# Patient Record
Sex: FEMALE | Race: WHITE
[De-identification: names, ages, dates, MRNs, and addresses within clinical notes are randomized per-mention and may not be internally consistent; named-entity substitution may affect disease eponyms.]

---

## 2019-02-15 ENCOUNTER — HOSPITAL ENCOUNTER (OUTPATIENT)
Dept: HOSPITAL 84 - D.ER | Age: 64
Setting detail: OBSERVATION
Discharge: HOME | End: 2019-02-15
Attending: INTERNAL MEDICINE | Admitting: INTERNAL MEDICINE
Payer: MEDICARE

## 2019-02-15 VITALS — DIASTOLIC BLOOD PRESSURE: 83 MMHG | SYSTOLIC BLOOD PRESSURE: 133 MMHG

## 2019-02-15 VITALS — SYSTOLIC BLOOD PRESSURE: 160 MMHG | DIASTOLIC BLOOD PRESSURE: 80 MMHG

## 2019-02-15 VITALS — WEIGHT: 215.45 LBS | HEIGHT: 69 IN | BODY MASS INDEX: 31.91 KG/M2

## 2019-02-15 VITALS — SYSTOLIC BLOOD PRESSURE: 147 MMHG | DIASTOLIC BLOOD PRESSURE: 91 MMHG

## 2019-02-15 DIAGNOSIS — I10: ICD-10-CM

## 2019-02-15 DIAGNOSIS — I25.119: Primary | ICD-10-CM

## 2019-02-15 DIAGNOSIS — I49.3: ICD-10-CM

## 2019-02-15 LAB
ALBUMIN SERPL-MCNC: 3.2 G/DL (ref 3.4–5)
ALP SERPL-CCNC: 46 U/L (ref 46–116)
ALT SERPL-CCNC: 19 U/L (ref 10–68)
ANION GAP SERPL CALC-SCNC: 17.6 MMOL/L (ref 8–16)
BASOPHILS NFR BLD AUTO: 0.3 % (ref 0–2)
BILIRUB SERPL-MCNC: 0.46 MG/DL (ref 0.2–1.3)
BUN SERPL-MCNC: 9 MG/DL (ref 7–18)
CALCIUM SERPL-MCNC: 7.9 MG/DL (ref 8.5–10.1)
CHLORIDE SERPL-SCNC: 105 MMOL/L (ref 98–107)
CK MB SERPL-MCNC: 5.5 U/L (ref 0–3.6)
CK SERPL-CCNC: 156 UL (ref 21–215)
CO2 SERPL-SCNC: 22.2 MMOL/L (ref 21–32)
CREAT SERPL-MCNC: 0.8 MG/DL (ref 0.6–1.3)
EOSINOPHIL NFR BLD: 2.5 % (ref 0–7)
ERYTHROCYTE [DISTWIDTH] IN BLOOD BY AUTOMATED COUNT: 13.8 % (ref 11.5–14.5)
GLOBULIN SER-MCNC: 3.8 G/L
GLUCOSE SERPL-MCNC: 116 MG/DL (ref 74–106)
HCT VFR BLD CALC: 41.3 % (ref 36–48)
HGB BLD-MCNC: 14.3 G/DL (ref 12–16)
IMM GRANULOCYTES NFR BLD: 0.1 % (ref 0–5)
LYMPHOCYTES NFR BLD AUTO: 17 % (ref 15–50)
MCH RBC QN AUTO: 30.9 PG (ref 26–34)
MCHC RBC AUTO-ENTMCNC: 34.6 G/DL (ref 31–37)
MCV RBC: 89.2 FL (ref 80–100)
MONOCYTES NFR BLD: 6.7 % (ref 2–11)
NEUTROPHILS NFR BLD AUTO: 73.4 % (ref 40–80)
OSMOLALITY SERPL CALC.SUM OF ELEC: 280 MOSM/KG (ref 275–300)
PLATELET # BLD: 189 10X3/UL (ref 130–400)
PMV BLD AUTO: 11.3 FL (ref 7.4–10.4)
POTASSIUM SERPL-SCNC: 3.8 MMOL/L (ref 3.5–5.1)
PROT SERPL-MCNC: 7 G/DL (ref 6.4–8.2)
RBC # BLD AUTO: 4.63 10X6/UL (ref 4–5.4)
SODIUM SERPL-SCNC: 141 MMOL/L (ref 136–145)
TROPONIN I SERPL-MCNC: < 0.017 NG/ML (ref 0–0.06)
WBC # BLD AUTO: 7.9 10X3/UL (ref 4.8–10.8)

## 2019-02-15 NOTE — NUR
PT DISCHARGED HOME WITH PAPER PLAVIX SCRIPT. DISCHARGE INSTRUCTIONS AND AFTER
CARE DIRECTIONS GIVEN. CATHETER SITE TO RIGHT RADIAL CLEAN AND DRY. VITALS
STABLE. PT ALERT AND ORIENTED. 20G IV DC'D WITH CATHETER TIP IN PLACE. PT
DENIES ANY FURTER NEEDS AT THIS TIME.

## 2019-02-15 NOTE — NUR
RECIEVED FROM CATH LAB. TELEMERTY SHOWS SR. RIGHT TR BAND WITH NO SWELLING OR
EDEMA . NO   NEEDS VOICED. V/S STABLE.

## 2019-02-15 NOTE — HEMODYNAMI
PATIENT:PAMELA RATLIFF                                MEDICAL RECORD: [id number]
: 57                                            [facility]AV[facility]-
[facility]# [id number]                                       ADMISSION DATE: 02/15/19
 
 
 Generatedon:2/15/817178:29
Patient name: PAMELA RATLIFF Patient #: [id number] Visit #: [id number] SSN: YOB: 1955 Date of
study: 2/15/2019
Page: Of
Hemodynamic Procedure Report
****************************
Patient Data
Patient Demographics
Procedure consent was obtained
First Name: PAMELA       Gender: Female
Last Name: GAUDENCIO            : 1955
Middle Initial: A           Age: 63 year(s)
Patient #: [id number]       Race: Unknown
Visit #: [id number]
Accession #:
[id number]
Additional ID: [id number]
Contact details
Address: [address] [place] [address]       Phone: [phone]
State: [place]
City: [place]
Zip code: [id number]
Admission
Admission Data
Admission Date: 2/15/2019   Admission Time: 15:55
Room #: D.E14
Procedure
Procedure Types
Cath Procedure
Diagnostic Procedure
LHC
LH w/Coronaries
Sedation Charges
Moderate Sedation up to 15 minutes
PCI Procedure
Coronary Stent
Coronary Stent Initial x2
Procedure Description
Procedure Date
Procedure Date: 2/15/2019
Procedure Start Time: 17:10
Procedure End Time: 17:25
Procedure Staff
Name                            Function
Tavon Varela MD                Performing Physician
Pamela Singleton RT             Monitor
Ansley Belle RN                Nurse
Fior Omer RT                    Scrub
Ivy De Dios RT               Scrub
Handy Esparza RT                  Circulator
Procedure Data
Cath Procedure
Fluoroscopy
 
Diagnostic fluoroscopy      Total fluoroscopy Time: 3.4
time: 3.4 min               min
Diagnostic fluoroscopy      Total fluoroscopy dose: 763
dose: 763 mGy               mGy
Contrast Material
Contrast Material Type                       Amount (ml)
Isovue 300                                   93
Entry Location
Entry     Primary  Successful  Side  Size  Upsize Upsize Entry    Closure     Jordan
ccessful  Closure
Location                             (Fr)  1 (Fr) 2 (Fr) Remarks  Device        
          Remarks
Radial                         Right 6 Fr                         Mechanical
artery                               Short                        Compression
Estimated blood loss: 5 ml
Diagnostic catheters
Device Type               Used For           End Catheter
Placement
DIAGNOSTIC D[place]in 110cm 5  Multi-vessel
Fr catheter ([id number])      Angiography
Procedure Complications
No complications
Procedure Medications
Medication           Administration Route Dosage
0.9% NaCl            I.V.                 100 ml/hr
Oxygen               etCO2 Nasal cannula  2 l/min
Lidocaine 2%         added to field       20
Heparin Flush Bag    added to field       2 bags
(1000units/500ml NS)
Radial Cocktail      added to field       1 syringe
(Verapomil 2mg/Nitro
400mcg/Heparin
1500units)
Versed               I.V.                 2 mg
Fentanyl             I.V.                 50 mcg
Versed               I.V.                 2 mg
Fentanyl             I.V.                 50 mcg
Fentanyl             I.V.                 100 mcg
Versed               I.V.                 2 mg
Heparin Bolus        I.V.                 4000 units
Integrilin (Bolus    I.V.                 9 ml
2mg/ml)
Plavix               P.O.                 600 mg
Hemodynamics
Rest
Heart Rate: 82 (bpm)
Pressure Samples
Time     Site     Value (mmHg) Purpose      Heart      Use
Rate(bpm)
17:12    LV       21/7,15      Snapshot     87
Snapshots
Pre Cath      Intra         NCS           Post Cath
Vital Signs
Time     Heart  Resp   SPO2 etCO2   NIBP (mmHg)  Rhythm  Pain    Sedation
Rate   (ipm)  (%)  (mmHg)                       Status  Level
(bpm)
16:58:55 75     13     98   23      157/90(118)  NSR     0 (11)  10(A)
, No
pain
17:03:26 77     15     98   31.9    148/85(123)  NSR     0 (11)  10(A)
 
, No
pain
17:08:45 76     10     99   29      144/94(113)  NSR     0 (11)  10(A)
, No
pain
17:12:59 86     21     99   32.7    127/108(119) NSR     0 (11)  10(A)
, No
pain
17:17:19 72     16     97   30.4    147/81(118)  NSR     0 (11)  10(A)
, No
pain
17:21:45 89     14     99   30      134/93(119)  NSR     0 (11)  10(A)
, No
pain
17:26:12 87     14     89   0       139/79(122)  NSR     0 (11)  10(A)
, No
pain
Medications
Time     Medication       Route   Dose    Verified Delivered Reason          Not
es    Effectiveness
by       by
16:58:05 0.9% NaCl        I.V.    100     Tavon Boogieyla     used for
ml/hr   Avery Belle   procedure
RN
16:58:12 Oxygen           etCO2   2 l/min Tavon Riveraa     used for
Nasal           Avery Belle   procedure
cannula                  RN
16:58:18 Lidocaine 2%     added   20ml    Tavon  Tavon   for local
to      vial    Avery Varela MD  anesthetic
field
16:58:23 Heparin Flush    added   2 bags  Tavon Santosrey   used for
Bag              to              Avery Varela MD  procedure
(1000units/500ml field
NS)
16:58:29 Radial Cocktail  added   1       Tavon  Tavon   used for
(Verapomil       to      syringe Avery Varela MD  procedure
2mg/Nitro        field
400mcg/Heparin
1500units)
17:01:05 Versed           I.V.    2 mg    Tavon  Ansley     for sedation
Avery Belle RN
17:01:14 Fentanyl         I.V.    50 mcg  Tavon  Ansley     for sedation
Avery Belle RN
17:06:09 Fentanyl         I.V.    50 mcg  Tavon  Ansley     for sedation
Avery Belle RN
17:06:25 Versed           I.V.    2 mg    Tavon  Ansley     for sedation
Avery Belle RN
17:12:16 Fentanyl         I.V.    100 mcg Tavon  Ansley     for sedation
Avery Belle RN
17:12:46 Versed           I.V.    2 mg    Tavon  Ansley     for sedation
Avery Belle RN
17:15:11 Heparin Bolus    I.V.    4000    Tavon  Ansley     for             evelyne
ified
units   Avery Belle   anticoagulation with Dr. FAUZIA Varela
17:21:26 Integrilin       I.V.    9 ml    Tavon  Ansley     for             was
zackery
(Bolus 2mg/ml)                   Avery Belle   anticoagulation 1mL
RN
17:21:42 Plavix           P.O.    600 mg  Tavon  Ansley     for
Avery Belle   antiplatelet
RN        therapy
Procedure Log
Time     Note
16:37:27 Time tracking: Stay late (Procedures after 5:00pm)
16:37:40 Plan of Care:Hemodynamics will remain stable., Cardiac
rhythm will remain stable., Comfort level will be
maintained., Respiratory function will remain
adequate., Patient/ family verbilizes understanding of
procedure., Procedure tolerated without complication.,
Recovers from procedure without complications..
16:43:47 Pamela Singleton RT(R) sent for patient. Start room
use.
16:55:38 Patient received from ED to CCL 1 Alert and oriented.
Tansferred to table in Supine position.
16:55:39 Warm blankets applied, and khadar hugger turned on for
patient comfort.
16:55:40 Correct patient and procedure confirmed by team.
16:55:41 Signed procedure consent form obtained from patient.
16:55:42 ECG and BP/O2 sat monitors applied to patient.
16:55:43 Full Disclosure recording started
16:57:15 Vital chart was started
16:57:18 Baseline sample Acquired.
16:57:32 Rhythm: sinus rhythm
16:57:41 H&P Date Dictated: 2/15/2019 New H&P dictated by
physician..
16:57:58 Pre-procedure instructions explained to patient.
16:57:59 Pre-op teaching completed and patient verbalized
understanding.
16:58:02 Family in waiting room.
16:58:05 0.9% NaCl 100 ml/hr I.V. was administered by Ansley Belle RN; used for procedure;
16:58:12 Oxygen 2 l/min etCO2 Nasal cannula was administered by
Ansley Belle RN; used for procedure;
16:58:18 Lidocaine 2% 20ml vial added to field was administered
by Tavon Varela MD; for local anesthetic;
16:58:23 Heparin Flush Bag (1000units/500ml NS) 2 bags added to
field was administered by Tavon Varela MD; used for
procedure;
16:58:29 Radial Cocktail (Verapomil 2mg/Nitro 400mcg/Heparin
1500units) 1 syringe added to field was administered
by Tavon Varela MD; used for procedure;
16:58:52 Patient NPO since Midnight.
16:58:55 Is the patient allergic to Iodine/contrast media? No.
16:58:56 Was the patient premedicated? No
16:59:02 Is patient on blood thinner?No
17:00:01 Patient diabetic? No.
17:00:03 Previous problem with sedation/anesthesia? No ?
17:00:05 Snore? No
17:00:06 Sleep apnea? No
17:00:07 Deviated septum? No
17:00:08 Opens mouth fully? Yes
17:00:08 Sticks out tongue? Yes
17:00:10 Airway obstruction? No ?
 
17:00:13 Dentures? Yes out
17:00:17 Pre procedure: right dorsailis pedis pulse 2+ Normal;
easily identifiable; not easily obliterated
17:00:19 Pre procedure: left dorsailis pedis pulse 2+ Normal;
easily identifiable; not easily obliterated
17:00:21 Patient pain scale 0/10 ?.
17:00:29 IV patent on arrival in left antecubital with 0.9%
NaCl at [facility].
17:00:31 Lab results completed and on chart.
17:00:35 Right Radial & Right Groin area was prepped with
chlora-prep and draped in sterile fashion
17:00:36 Alarms reviewed by R. N.
17:00:37 Sharps counted by scrub and verified by R.N.
17:00:40 Physician arrived
17:00:40 --------ALL STOP TIME OUT------
17:00:41 Final Timeout: patient, procedure, and site verified
with staff and physician. All members of the team are
in agreement.
17:00:42 Right Radial & Right Groin site verified by team.
17:00:47 Fire Safety Assessment: A--An alcohol-based skin
anteseptic being used preoperatively., C--Open oxygen
or nitrous oxide is being used., D--An ESU, laser, or
fiber-optic light is being used.
17:00:51 Physical assessment completed. ASA score P 2 - A
patient with mild systemic disease as per Tavon Varela MD.
17:00:56 Sedation plan: IV Moderate Sedation Medication:Versed,
Fentanyl
17:01:05 Versed 2 mg I.V. was administered by Ansley Belle RN;
for sedation;
17:01:14 Fentanyl 50 mcg I.V. was administered by Ansley Belle
RN; for sedation;
17:06:09 Fentanyl 50 mcg I.V. was administered by Ansley Belle
RN; for sedation;
17:06:25 Versed 2 mg I.V. was administered by Ansley Belle RN;
for sedation;
17:07:07 Baseline sample Acquired.
17:10:09 Procedure started.
17:10:20 Local anesthetic to right radial artery with Lidocaine
2% by Tavon Varela MD.**INITIAL ACCESS ONLY**
17:10:36 A 6 Fr Short sheath was inserted into the Right Radial
artery
17:11:09 Use device set Radial Dx or PCI
17:11:10 ACIST Syringe (23342) opened to sterile field.
17:11:11 Medline Cath Pack ([id number]) opened to sterile field.
17:11:11 Bag Decanter () opened to sterile field.
17:11:11 DIAGNOSTIC WIRE .035 260cm J wire (329869) opened to
sterile field.
17:11:12 ACIST Hand Control (61281) opened to sterile field.
17:11:12 ACIST Manifold (23093) opened to sterile field.
17:11:13 Tegaderm 4 x 4 (1626W) opened to sterile field.
17:11:14 MBrace Wrist Support ([id number]) opened to sterile
field.
17:11:15 SHEATH 6FR Slender () opened to sterile field.
17:11:32 A DIAGNOSTIC Tiger 110cm 5 Fr catheter (389636) was
advanced over the wire and used for Multi-vessel
Angiography.
17:12:16 Fentanyl 100 mcg I.V. was administered by Ansley Belle RN; for sedation;
17:12:43 LV hemodynamics recorded.
 
17:12:45 LV gram done using JAEGER
17:12:46 Versed 2 mg I.V. was administered by Ansley Belle RN;
for sedation;
17:12:50 EF : 55 %
17:13:13 LCA angiography performed.
17:13:16 Injector settings: Ml/sec: 3, Volume: 6,
17:13:48 RCA angiography performed.
17:13:51 Injector settings: Ml/sec: 3, Volume: 6,
17:14:21 Catheter removed.
17:14:33 GUIDE 6FR AR 2.0 catheter ([id number]) opened to sterile
field.
17:14:35 CHOICE PT Extra Support 182cm wire ([id number]) opened
to sterile field.
17:14:36 INFLATOR Merit BasixCompak (HT7223) opened to sterile
field.
17:14:40 Proceeding to intervention.
17:14:50 6 Fr ar 2 guide catheter was inserted over the wire
17:14:54 choice pt wire advanced.
17:14:56 Wire advanced across lesion.
17:15:11 Heparin Bolus 4000 units I.V. was administered by
Ansley Belle RN; for anticoagulation; verified with
Dr. Varela
17:18:07 Place stent Inflation Number: 1 A INTEGRITY RX 2.5 x
12 stent ([id number]) was prepped and advanced across
the Dist RCA. The stent was deployed at 13 CESAR for
0:10 (min:sec).
17:18:51 Stent catheter was removed intact over wire.
17:18:56 Wire removed.
17:18:57 Guide catheter removed.
17:19:52 GUIDE 6FR XBLAD 3.5 catheter ([id number]) opened to
sterile field.
17:20:08 6 Fr xblad 3.5 guide catheter was inserted over the
wire
17:20:17 choice pt wire advanced.
17:20:18 Wire advanced across lesion.
17:21:26 Integrilin (Bolus 2mg/ml) 9 ml I.V. was administered
by Ansley Belle RN; for anticoagulation; wasted 1mL
17:21:38 Place stent Inflation Number: 1 A INTEGRITY RX 2.5 x
14 stent ([id number]) was prepped and advanced across
the Prox CX. The stent was deployed at 15 CESAR for 0:10
(min:sec).
17:21:42 Plavix 600 mg P.O. was administered by Ansley Belle
RN; for antiplatelet therapy;
17:21:44 Stent catheter was removed intact over wire.
17:21:45 Wire removed.
17:21:46 Guide catheter removed.
17:23:04 TR BAND Large ([id number]) opened to sterile field.
17:24:14 Sheath removed intact; hemostasis achieved with
Mechanical Compression to the Right Radial artery.
17:24:16 Procedure ended.(Physican Out)
17:24:28 Fluoroscopy time 03.40 minutes.
17:24:32 Fluoroscopy dose: 763 mGy
17:24:32 Flurop Dose total: 763
17:24:39 Contrast amount:Isovue 300 93ml.
17:24:41 Sharps counted by scrub and verified by R.N.
17:24:44 TR band inflated with 10cc of air.
17:24:46 Insertion/operative site no bleeding no hematoma.
17:24:50 Post right radial artery:stable
17:24:51 Post Procedure Pulses reassessed and unchanged
17:24:54 Post procedure rhythm: unchanged.
 
17:24:57 Estimated blood loss: 5 ml
17:24:58 Post procedure instruction explained to
patient.Patient verbalizes understanding.
17:24:59 Patient needs reinforcement of post procedure
teaching.
17:25:24 Procedure type changed to Cath procedure, Diagnostic
procedure, LHC, LHC w/Coronaries, Sedation Charges,
Moderate Sedation up to 15 minutes, PCI procedure,
Coronary Stent, Coronary Stent Initial x2
17:25:25 Procedure and supply charges have been captured,
reviewed, submitted and are correct.
17:25:30 Procedure Complication : No complications
17:25:32 Vital chart was stopped
17:25:32 See physician's report for complete and final results.
17:25:39 Report given to [facility].
17:25:42 Patient transfered to [facility] with Stretcher.
17:25:45 Procedure ended.
17:25:45 Full Disclosure recording stopped
17:25:54 **ACC-PCI Only** Patient was given prescriptions, or
instructed by Tavon Varela MD to start/continue the
following medications upon discharge: Plavix
17:25:56 End room use (Document Last)
Intervention Summary
Intervention Notes
Time     ActionType  Lesion and  Equipment    Action#  Pressure  Duration
Attributes  Used
17:18:07 Place stent Dist RCA    INTEGRITY RX 1        13        00:10
2.5 x 12
stent
([id number])
17:21:38 Place stent Prox CX     INTEGRITY RX 1        15        00:10
2.5 x 14
stent
([id number])
Device Usage
Item Name    Manufacture  Quantity  Catalog Number Hospital Part    Current Mini
mal Lot# /
Charge   Number  Stock   Stock   Serial#
Code
ACIST        Acist        1         88073          420953   971117  509731  20
Syringe      Medical
(23661)      Systems Inc
Medline Cath Medline      1         [id number]      229004   37092   224114  5
Pack
([id number])
Bag Decanter Microtek     1         S          519646   48534   918411  5
(S)      Medical Inc.
DIAGNOSTIC   [place]      1         393854         554030   406865  686667  30
WIRE .035
260cm J wire
(630976)
ACIST Hand   Acist        1         50989          638235   804959  969365  5
Control      Medical
(10774)      Systems Inc
ACIST        Acist        1         04358          843904   296023  177157  5
Manifold     Medical
(16075)      Systems Inc
Tegaderm 4 x 3M           1         1626W          441481   442656  266912  5
4 (1626W)
MBrace Wrist Advanced     1         140-0250-00    593926   40119   482137  5
 
Support      Vascular
(290353614)  Dynamics
SHEATH 6FR   Terumo       1         [id number]     128074   361117  158624  5
Slender
()
DIAGNOSTIC   Terumo       1                 446059   750751  115906  5
Tiger 110cm
5 Fr
catheter
(854802)
GUIDE 6FR AR Medtronic    1         [id number]        420202   98858   074759  1
2.0 catheter
(IE7XT76)
CHOICE PT    [place]       1         [id number]  180903   521672  600224  5
Extra        Scientific
Support
182cm wire
(8191035S8)
INFLATOR     Merit        1         WI2394         257508   321094  963861  15
[facility]        Medical
BasixCompak
(XM2680)
INTEGRITY RX Medtronic    1         [id number]     303787   062852  579250  5   
    [id number]
2.5 x 12
stent
([id number])
GUIDE 6FR    Cardinal     1         [id number]       536201   582783  331533  10
XBLAD 3.5    Health
catheter
(99788841)
INTEGRITY RX Medtronic    1         [id number]     176244   496387  327168  5   
    [id number]
2.5 x 14
stent
([id number])
TR BAND      Terumo       1         VRC78-MOW      160315   476661  830321  40
Large
([id number])
Signature Audit [place]
Stage           Time        Signature      Unsigned
Intra-Procedure 2/15/2019   Fior Omer
5:29:45 PM  RT(R)
Signatures
Monitor : Pamela     Signature :
Counts RT               ______________________________
Date : ______________ Time :
______________
 
 
 
 
 
 
 
[place] [address] [facility] [place] [address]

## 2019-02-18 NOTE — OP
PATIENT NAME:  LEON RATLIFF                         MEDICAL RECORD: [id number]
:[birth year]                                             LOCATION:D.M2     D.2123
                                                         ADMISSION DATE:02/15/19
SURGEON:  SHANON RICHMOND MD             
 
 
DATE OF OPERATION:  02/15/2019
 
PROCEDURES:
1.  PTCA and stent of RCA.
2.  PTCA and stent of left circumflex.
3.  Left heart catheterization.
4.  Selective coronary angiography.
5.  Left ventriculogram.
 
INDICATIONS:  Angina and coronary artery disease.
 
PROCEDURE PERFORMED:  After informed consent was obtained and after detailed
explanation of risks, benefits as well as alternative therapies, the patient
elected to proceed with angiogram and angioplasty.  The right femoral area was
prepped and draped in normal sterile fashion.  Right femoral artery was
cannulated via Seldinger technique with placement of 6-Belarusian sheath.  All
catheters exchanged through this sheath.
 
FINDINGS:  The left ventriculogram was performed in standard 30-degree JAEGER view,
reveals good cardiac wall motion throughout all segments.  Overall ejection
fraction 55%.
 
SELECTIVE CORONARY ANGIOGRAPHY:
1.  Left main showed no significant angiographic disease.
2.  Left anterior descending has mild irregularities, but no flow-limiting
stenosis.
3.  The left circumflex has 90% stenosis proximally.
4.  The right coronary has 90% stenosis distally.
 
PTCA AND STENT OF THE LEFT CIRCUMFLEX and RCA:  RCA was addressed with a 2.5 x
12-mm Integrity stent and the circumflex with a 2.5 x 14-mm Integrity stent. 
Result was 0% residual stenosis.
 
OVERALL IMPRESSION:  Successful PTCA and stent of the left circumflex and RCA
going from 90% initial stenosis on each vessel to 0% residual.
 
TRANSINT:[id number] Voice Confirmation ID: [id number] DOCUMENT ID: [id number]
                                           
                                           SHANON RICHMOND MD             
 
 
 
Electronically Signed by SHANON RICHMOND on 19 at 1146
 
CC:                                                             [id number]
DICTATION DATE: 02/15/19 1727     :     19 0056      DIS IN  
                                                                      02/15/19
[place] [address] [facility], [facility]

## 2019-02-18 NOTE — MORECARE
CASE MANAGEMENT DISCHARGE SUMMARY
 
 
PATIENT: LEON RATLIFF                   UNIT: [id number]
ACCOUNT#: [id number]                       ADM DATE: 02/15/19
AGE: 63     : [birth year]  SEX: F            ROOM/BED: D.1263    
AUTHOR: LYDIA AGUSTIN                             PHYSICIAN:                               
 
REFERRING PHYSICIAN: SHANON RICHMOND MD             
DATE OF SERVICE: 19
Discharge Plan
 
 
Patient Name: LEON RATLIFF
[facility]
Encounter #: [id number]
Medical Record #: [id number]
: 1955
Planned Disposition: Home
Anticipated Discharge Date: 2/15/19
 
Discharge Date: 02/15/2019
Expected LOS: 1
Initial Reviewer: [email or web address]
Initial Review Date: 2019
Generated: 19  10:11 am 
  
 
 
 
 
 
 
Coverage Notice
 
Reviewer: [email or web address] Evette Álvarez
 
Notice Issued Date-Time: 02/15/2019  16:20
Notice Type: Medicare Outpatient Observation Notice
 
Notice Delivered To: Patient
Relationship to Patient: 
Representative Name: 
 
Delivery Method: HAND - Hand Delivered
Rhonda Days:
Prior Verbal Notification: 
 
Recipient Understood Notice: Yes
Recipient Signature: Yes
 
Med Rec Note Co-signed by Attending:
 
Coverage Notice Comment:  
Patient Name: LEON RATLIFF
Encounter #: [id number]
Page [phone]
 
 
 
 
 
Electronically Signed by LYDIA AGUSTIN on 19 at 0911
 
 
 
 
 
 
**All edits/amendments must be made on the electronic document**
 
DICTATION DATE: 19     : OSIRIS  19     
RPT#: [id number]                                DC DATE:02/15/19
                                               STATUS: DIS IN  
[facility]
[address] [place] [address]
***END OF REPORT***

## 2019-07-05 ENCOUNTER — HOSPITAL ENCOUNTER (OUTPATIENT)
Dept: HOSPITAL 84 - D.ER | Age: 64
Setting detail: OBSERVATION
LOS: 1 days | Discharge: HOME | End: 2019-07-06
Attending: INTERNAL MEDICINE | Admitting: INTERNAL MEDICINE
Payer: MEDICARE

## 2019-07-05 VITALS — BODY MASS INDEX: 29.68 KG/M2 | HEIGHT: 69 IN | BODY MASS INDEX: 29.68 KG/M2 | WEIGHT: 200.42 LBS

## 2019-07-05 VITALS — DIASTOLIC BLOOD PRESSURE: 74 MMHG | SYSTOLIC BLOOD PRESSURE: 128 MMHG

## 2019-07-05 DIAGNOSIS — I21.4: Primary | ICD-10-CM

## 2019-07-05 DIAGNOSIS — G45.3: ICD-10-CM

## 2019-07-05 DIAGNOSIS — I25.119: ICD-10-CM

## 2019-07-05 DIAGNOSIS — F32.9: ICD-10-CM

## 2019-07-05 DIAGNOSIS — F12.90: ICD-10-CM

## 2019-07-05 DIAGNOSIS — F17.213: ICD-10-CM

## 2019-07-05 DIAGNOSIS — L40.9: ICD-10-CM

## 2019-07-05 DIAGNOSIS — I10: ICD-10-CM

## 2019-07-05 DIAGNOSIS — F64.0: ICD-10-CM

## 2019-07-05 LAB
ALBUMIN SERPL-MCNC: 3.3 G/DL (ref 3.4–5)
ALP SERPL-CCNC: 48 U/L (ref 46–116)
ALT SERPL-CCNC: 28 U/L (ref 10–68)
ANION GAP SERPL CALC-SCNC: 12.9 MMOL/L (ref 8–16)
APTT BLD: 28.9 SECONDS (ref 22.8–39.4)
BASOPHILS NFR BLD AUTO: 0.1 % (ref 0–2)
BILIRUB SERPL-MCNC: 0.42 MG/DL (ref 0.2–1.3)
BUN SERPL-MCNC: 11 MG/DL (ref 7–18)
CALCIUM SERPL-MCNC: 8.8 MG/DL (ref 8.5–10.1)
CHLORIDE SERPL-SCNC: 102 MMOL/L (ref 98–107)
CK MB SERPL-MCNC: 4.3 U/L (ref 0–3.6)
CK MB SERPL-MCNC: 4.9 U/L (ref 0–3.6)
CK SERPL-CCNC: 129 UL (ref 21–215)
CK SERPL-CCNC: 157 UL (ref 21–215)
CO2 SERPL-SCNC: 23.9 MMOL/L (ref 21–32)
CREAT SERPL-MCNC: 0.8 MG/DL (ref 0.6–1.3)
EOSINOPHIL NFR BLD: 0.7 % (ref 0–7)
ERYTHROCYTE [DISTWIDTH] IN BLOOD BY AUTOMATED COUNT: 12.8 % (ref 11.5–14.5)
GLOBULIN SER-MCNC: 3.8 G/L
GLUCOSE SERPL-MCNC: 116 MG/DL (ref 74–106)
HCT VFR BLD CALC: 42.3 % (ref 36–48)
HGB BLD-MCNC: 15.2 G/DL (ref 12–16)
IMM GRANULOCYTES NFR BLD: 0.2 % (ref 0–5)
INR PPP: 1.04 (ref 0.85–1.17)
LYMPHOCYTES NFR BLD AUTO: 10.6 % (ref 15–50)
MAGNESIUM SERPL-MCNC: 1.9 MG/DL (ref 1.8–2.4)
MCH RBC QN AUTO: 32 PG (ref 26–34)
MCHC RBC AUTO-ENTMCNC: 35.9 G/DL (ref 31–37)
MCV RBC: 89.1 FL (ref 80–100)
MONOCYTES NFR BLD: 6.2 % (ref 2–11)
NEUTROPHILS NFR BLD AUTO: 82.2 % (ref 40–80)
OSMOLALITY SERPL CALC.SUM OF ELEC: 269 MOSM/KG (ref 275–300)
PLATELET # BLD: 226 10X3/UL (ref 130–400)
PMV BLD AUTO: 11.5 FL (ref 7.4–10.4)
POTASSIUM SERPL-SCNC: 3.8 MMOL/L (ref 3.5–5.1)
PROT SERPL-MCNC: 7.1 G/DL (ref 6.4–8.2)
PROTHROMBIN TIME: 13.1 SECONDS (ref 11.6–15)
RBC # BLD AUTO: 4.75 10X6/UL (ref 4–5.4)
SODIUM SERPL-SCNC: 135 MMOL/L (ref 136–145)
TROPONIN I SERPL-MCNC: 0.1 NG/ML (ref 0–0.06)
TROPONIN I SERPL-MCNC: 0.11 NG/ML (ref 0–0.06)
WBC # BLD AUTO: 12.3 10X3/UL (ref 4.8–10.8)

## 2019-07-05 PROCEDURE — 93458 L HRT ARTERY/VENTRICLE ANGIO: CPT

## 2019-07-05 PROCEDURE — 36222 PLACE CATH CAROTID/INOM ART: CPT

## 2019-07-05 NOTE — NUR
PT C/O CHEST PAIN TO LEFT SIDE OF CHEST AND INTO HER BACK. MEDICATED WITH
MORHPHINE 5MG SIVP + ZOFRAN 4MG SIVP AT THIS TIME.

## 2019-07-05 NOTE — NUR
first nitro - 177/98 - 7/10 @ 1704
second nitro - 127/75 - 6/10 @1709
third nitro - 128/74 - 4/10 @1714

## 2019-07-05 NOTE — HEMODYNAMI
PATIENT:LEON RATLIFF                                MEDICAL RECORD: [id number]
: 57                                            LOCATION:[facility]     D.2120
[id number]                                       ADMISSION DATE: 19
 
 
 Generatedon:201911:41
Patient name: LEON RATLIFF Patient #: [id number] Visit #: [id number] SSN: YOB: 1955 Date
of study: 2019
Page: Of
Hemodynamic Procedure Report
****************************
Patient Data
Patient Demographics
Procedure consent was obtained
First Name: LEON       Gender: Female
Last Name: GAUDENCIO            : 1955
Middle Initial: A           Age: 63 year(s)
Patient #: [id number]       Race: Unknown
Visit #: [id number]
Accession #:
[id number]
Additional ID: [id number]
Contact details
Address: [address] [place] [address]       Phone: [phone]
State: [place]
City: [place]
Zip code: [address]
Past Medical History
Allergies: No known allergies
Admission
Admission Data
Admission Date: 2019    Admission Time: 18:32
Room #: D.2120
Weight (lbs.): 200.62
Weight (kg.): 91
Lab Results
Lab Result Date: 2019   Lab Result Time: 0:00
Biochemistry
Name         Units    Result                Min      Max
BUN          mg/dl    11       --(-*--)--   7        18
Creatinine   mg/dl    0.8      --(-*--)--   0.6      1.3
eGFR         ml/min   76.57716 *-(----)--   90       120
NONAFRICAN
CBC
Name         Units    Result                Min      Max
Hematocrit   %        42.5     --(*---)--   42       54
Hemoglobin   g/dl     14.8     --(-*--)--   13.5     17.5
Procedure
Procedure Types
Cath Procedure
Diagnostic Procedure
C
[facility] w/Coronaries
Sedation Charges
Moderate Sedation up to 15 minutes
PCI Procedure
Coronary Stent
Coronary Stent Initial
 
Peripheral Cath Diagnostic Procedure
Cath Lab Peripheral Procedures
Four Vessel Arteriogram
Procedure Description
Procedure Date
Procedure Date: 2019
Procedure Start Time: 11:18
Procedure End Time: 11:40
Procedure Staff
Name                            Function
Robert Schaefer MD              Performing Physician
Dede Nicolas RT               Monitor
Handy Esparza RT                  Scrub
Ck Hernandez RN                  Nurse
Procedure Data
Cath Procedure
Fluoroscopy
Diagnostic fluoroscopy      Total fluoroscopy Time: 3.6
time: 3.6 min               min
Diagnostic fluoroscopy      Total fluoroscopy dose: 586
dose: 586 mGy               mGy
Contrast Material
Contrast Material Type                       Amount (ml)
Isovue 300                                   107
Entry Location
Entry     Primary  Successful  Side  Size  Upsize Upsize Entry    Closure Succes
sful  Closure
Location                             (Fr)  1 (Fr) 2 (Fr) Remarks  Device        
      Remarks
Femoral                        Right 5 Fr  6 Fr                   Exoseal
artery                                     Short
Estimated blood loss: 10 ml
Diagnostic catheters
Device Type               Used For           End Catheter
Placement
MULTIPACK JL 4.0 5Fr      Procedure
catheter
DIAGNOSTIC 3DRC 5Fr       Procedure
catheter (992012O)
MULTIPACK Pigtail 5 Fr    Procedure
catheter
Procedure Complications
No complications
Procedure Medications
Medication           Administration Route Dosage
Oxygen               etCO2 Nasal cannula  2 l/min
Lidocaine 2%         added to field       20
Heparin Flush Bag    added to field       2 bags
(1000units/500ml NS)
0.9% NaCl            I.V.                 100 ml/hr
Versed               I.V.                 2 mg
Fentanyl             I.V.                 50 mcg
Versed               I.V.                 2 mg
Fentanyl             I.V.                 50 mcg
Versed               I.V.                 2 mg
Fentanyl             I.V.                 50 mcg
Heparin Bolus        I.V.                 4000 units
Integrilin (Bolus    I.V.                 8.5 ml
2mg/ml)
Versed               I.V.                 2 mg
 
Fentanyl             I.V.                 50 mcg
Plavix               P.O.                 600 mg
Hemodynamics
Rest
HGB: 14.8 (g/dl) Heart Rate: 69 (bpm)
Pressure Samples
Time     Site     Value (mmHg) Purpose      Heart      Use
Rate(bpm)
11:24    LV       156/21,21    Snapshot     101
11:24    AO       171/87(127)  Pullback     71
Gradients
Valve  Time  Site Site 2      Mean    SEP/DFP    Peak To Heart  Use
1                (mmHg)  (sec/min)  Peak    Rate
(mmHg)  (bpm)
Aortic 11:24 LV   AO                                     71
171/87(127)
Snapshots
Pre Cath      Intra         NCS           Post Cath
Vital Signs
Time     Heart  Resp   SPO2 etCO2   NIBP (mmHg) Rhythm  Pain    Sedation
Rate   (ipm)  (%)  (mmHg)                      Status  Level
(bpm)
11:01:21 69     14     95   0       162/85(127) NSR     0 (11)  10(A)
, No
pain
11:05:43 73     28     94   0       159/92(124) NSR     0 (11)  10(A)
, No
pain
11:10:01 64     15     94   1.5     137/80(116) NSR     0 (11)  10(A)
, No
pain
11:15:10 66     10     94   12.7    146/77(127) NSR     0 (11)  10(A)
, No
pain
11:19:24 64     13     94   18.8    129/76(114) NSR     0 (11)  9(A)
, No
pain
11:24:30 85     15     92   35.3    166/71(139) NSR     0 (11)  10(A)
, No
pain
11:28:54 76     10     94   32.3    155/84(127) NSR     0 (11)  10(A)
, No
pain
11:33:16 80     12     93   7.5     163/81(121) NSR     0 (11)  10(A)
, No
pain
11:37:32 79     13     94   33.8    147/80(131) NSR     0 (11)  10(A)
, No
pain
Medications
Time     Medication       Route   Dose  Verified Delivered Reason          Notes
    Effectiveness
by       by
11:08:08 Oxygen           etCO2   2     Robert  Ck    used for
Nasal   l/min St Micheal Hernandez RN   procedure
cannula       MD
11:08:16 Lidocaine 2%     added   20ml  Robert Jones   for local
to      vial  [facility]   anesthetic
field         MD       MD
11:08:21 Heparin Flush    added   2     Robert  Robert   used for
 
Bag              to      bags  [facility]   procedure
(1000units/500ml field         MD       MD
NS)
11:08:30 0.9% NaCl        I.V.    100   Robert Stover    Per physician
ml/hr [facility] Hernandez RN, MD
11:09:20 Versed           I.V.    2 mg  Robert  Buffie    for sedation
[facility] Hernandez RN, MD
11:09:27 Fentanyl         I.V.    50    Robert  Buffie    for sedation
mcg   [facility] Hernandez RN, MD
11:13:50 Versed           I.V.    2 mg  Robert  Buffie    for sedation
[facility] Hernandez RN, MD
11:13:56 Fentanyl         I.V.    50    Robert  Buffie    for sedation
sherry Bolivar RN, MD
11:18:11 Versed           I.V.    2 mg  Robert  Buffie    for sedation
[facility] Hernandez RN, MD
11:18:57 Fentanyl         I.V.    50    Robert  Buffie    for sedation
[facility]   [facility] RN, MD
11:26:44 Heparin Bolus    I.V.    4000  Robert Stover    for             verif
ied
units St Micheal Hernandez RN   anticoagulation with dr MD treviño
11:28:05 Integrilin       I.V.    8.5   Robert Stover    for             waste
d
(Bolus 2mg/ml)           ml    [facility] Hernandez RN   antiplatelet    1.5 ml
MD                 therapy         of vial
11:30:03 Versed           I.V.    2 mg  Robert Stover    for sedation
[facility] Hernandez RN, MD
11:30:07 Fentanyl         I.V.    50    Robert Stover    for sedation
mcg   [facility] Hernandez RN, MD
11:37:33 Plavix           P.O.    600   Robert Stover    for
mg    Olive  Bell RN   antiplatelet
MD                 therapy
Procedure Log
Time     Note
10:29:52 Handy Esparza RT(R) sent for patient. Start room use.
10:29:54 Time tracking: Call back (After hours or weekends)
10:29:58 Plan of Care:Hemodynamics will remain stable., Cardiac
rhythm will remain stable., Comfort level will be
maintained., Respiratory function will remain
adequate., Patient/ family verbilizes understanding of
procedure., Procedure tolerated without complication.,
Recovers from procedure without complications..
10:38:14 Signed procedure consent form obtained from patient.
10:38:16 Diagnostic Cath status Urgent
10:43:37 Patient allergic to No known allergies
10:43:42 Patient Weight : 200.62 lbs
10:44:20 Lab Result : BUN 11 mg/dl
10:44:20 Lab Result : eGFR NONAFRICAN 76.20590 ml/min
10:44:20 Lab Result : Creatinine 0.8 mg/dl
10:44:20 Lab Result : Hematocrit 42.5 %
10:44:20 Lab Result : Hemoglobin 14.8 g/dl
 
10:49:03 Patient received from Med II to CCL 1 Alert and
oriented. Tansferred to table in Supine position.
10:49:04 Warm blankets applied, and khadar hugger turned on for
patient comfort.
10:49:04 Correct patient and procedure confirmed by team.
10:49:05 ECG and BP/O2 sat monitors applied to patient.
10:55:20 IV left forearm D/C'd due to infiltration.
10:55:35 IV started by Ck Hernandez RN inright forearm with a 22
gauge IV catheter with 0.9% NaCl at KVO.
11:00:14 Vital chart was started
11:00:20 Rhythm: sinus rhythm
11:00:22 Full Disclosure recording started
11:00:27 Pre-procedure instructions explained to patient.
11:00:28 Pre-op teaching completed and patient verbalized
understanding.
11:00:33 H&P Date Dictated: 2019 Within 30 days and on
chart..
11:00:36 Family unavailable.
11:00:37 Patient NPO since Midnight.
11:00:39 Is the patient allergic to Iodine/contrast media? No.
11:00:41 Is patient on blood thinner?No
11:00:44 Patient diabetic? No.
11:00:47 Previous problem with sedation/anesthesia? No ?
11:00:50 Snore? Yes
11:00:51 Sleep apnea? No
11:00:52 Deviated septum? No
11:00:52 Opens mouth fully? Yes
11:00:53 Sticks out tongue? Yes
11:00:55 Airway obstruction? No ?
11:00:59 Dentures? Yes IN
11:01:03 Pre procedure: right dorsailis pedis pulse 1+
Palpable, but thready & weak; easily obliterated
11:01:05 Patient pain scale 0/10 ?.
11:01:10 Lab results completed and on chart.
11:01:13 Right groin area was prepped with chlora-prep and
draped in sterile fashion
11:01:15 Alarms reviewed by R. N.
11:01:15 Sharps counted by scrub and verified by R.N.
11:01:20 Baseline sample Acquired.
11:08:05 Use device set Femoral Dx
11:08:08 Oxygen 2 l/min etCO2 Nasal cannula was administered by
Ck Hernandez RN; used for procedure;
11:08:08 ACIST Syringe (00858) opened to sterile field.
11:08:08 Bag Decanter (2002S) opened to sterile field.
11:08:09 ACIST Hand Control (44738) opened to sterile field.
11:08:10 ACIST Manifold (38661) opened to sterile field.
11:08:11 Tegaderm 4 x 4 (1626W) opened to sterile field.
11:08:13 Medline Cath Pack ([id number]) opened to sterile field.
11:08:14 DIAGNOSTIC Multipack 5Fr catheter set (VB1978) opened
to sterile field.
11:08:15 SHEATH 5FR [place] (DOM403) opened to sterile field.
11:08:16 Lidocaine 2% 20ml vial added to field was administered
by Robert Schaefer MD; for local anesthetic;
11:08:16 EMERALD Guide Wire (903-675) opened to sterile field.
11:08:21 Heparin Flush Bag (1000units/500ml NS) 2 bags added to
field was administered by Robert Schaefer MD; used for
procedure;
11:08:30 0.9% NaCl 100 ml/hr I.V. was administered by Ck Hernandez RN; Per physician;
11::53 --------ALL STOP TIME OUT------
 
::53 Final Timeout: patient, procedure, and site verified
with staff and physician. All members of the team are
in agreement.
11:08:55 Right groin site verified by team.
11:08:59 Fire Safety Assessment: A--An alcohol-based skin
anteseptic being used preoperatively., C--Open oxygen
or nitrous oxide is being used., D--An ESU, laser, or
fiber-optic light is being used.
11:09:02 Physical assessment completed. ASA score P 2 - A
patient with mild systemic disease as per Robert Schaefer MD.
11:09:05 Maximum allowable contrast does (3.7 X eGFR X 0.75)213
ml.
11:09:11 2) 60-89 Mildly reduced kidney function, and other
findings (as for stage 1) point to kidney disease.
11:09:15 Sedation plan: IV Moderate Sedation Medication:Versed,
Fentanyl
11::20 Versed 2 mg I.V. was administered by Ck Hernandez RN;
for sedation;
11::27 Fentanyl 50 mcg I.V. was administered by Ck Hernandez
RN; for sedation;
11::50 Versed 2 mg I.V. was administered by Ck Hernandez RN;
for sedation;
11::56 Fentanyl 50 mcg I.V. was administered by Ck Hernandez
RN; for sedation;
11:17:06 Zero performed for pressure channel P1
11:17:28 Procedure started.
11:18:02 Local anesthetic to right femoral artery with
Lidocaine 2% by Robert Schaefer MD.**INITIAL ACCESS
ONLY**
11:18:11 Versed 2 mg I.V. was administered by Ck Hernandez RN;
for sedation;
11:18:44 A 5 Fr sheath was inserted into the Right Femoral
artery
11:18:57 Fentanyl 50 mcg I.V. was administered by Ck Hernandez
RN; for sedation;
11:19:48 A MULTIPACK JL 4.0 5Fr catheter was advanced over the
wire and used for Procedure.
11:20:53 LCA angiography performed.
11:20:56 Catheter removed.
11:21:26 A DIAGNOSTIC 3DRC 5Fr catheter (514014S) was advanced
over the wire and used for Procedure.
11:22:11 RCA angiography performed.
11:22:41 Right carotid angiography performed.
11:23:05 Left carotid angiography performed.
11:23:14 Catheter removed.
11::48 A MULTIPACK Pigtail 5 Fr catheter was advanced over
the wire and used for Procedure.
11:24:14 LV gram done using JAEGER
11:24:17 Injector settings: Ml/sec: 10, Volume: 20,
11:24:34 LV hemodynamics recorded.
11:24:38 EF : 55 %
11:24:39 Catheter removed.
11:24:42 SHEATH 6FR [place] (NPI461) opened to sterile field.
11:24:42 WHISPER 300cm guide wire ([id number]) opened to sterile
field.
11:24:43 INFLATOR Merit BasixCompak (HS9859) opened to sterile
field.
11:24:43 GUIDE 6FR HS I catheter (LA6HSI) opened to sterile
field.
 
11:25:38 Sheath upsized to a 6 Fr Short.
11::44 Heparin Bolus 4000 units I.V. was administered by
Ck Hernandez RN; for anticoagulation; verified with dr treviño
11:26:45 6 Fr HS 1 guide catheter was inserted over the wire
11:27:58 WHISPER 300 wire advanced.
11:28:05 Integrilin (Bolus 2mg/ml) 8.5 ml I.V. was administered
by Ck Hernandez RN; for antiplatelet therapy; wasted
1.5 ml of vial
11:28:57 Wire advanced across lesion.
11:29:34 Inflate balloon Inflation number: 1 A EMERGE OTW 3.0 x
15 balloon ([id number]) was prepped and advanced
across the Dist RCA , then inflated to 12 CESAR for 0:20
(min:sec) .
11:30:03 Versed 2 mg I.V. was administered by Ck Hernandez RN;
for sedation;
11:30:07 Fentanyl 50 mcg I.V. was administered by Ck Hernandez
RN; for sedation;
11:30:18 Balloon removed over the wire.
11:32:34 Place stent Inflation Number: 2 A ERICKA RX 3.0 x 15
stent ([id number]) was prepped and advanced across
the Dist RCA . The stent was deployed at 12 CESAR for
0:15 (min:sec) .
11:33:09 Stent catheter was removed intact over wire.
11:33:09 Wire removed.
11:33:10 Guide catheter removed.
11:33:15 EXOSEAL 6Fr () opened to sterile field.
11:34:27 Sheath removed intact; hemostasis achieved with
Exoseal to the Right Femoral artery.
11:34:30 Procedure ended.(Physican Out)
11:34:51 Fluoroscopy time 03.60 minutes.
11:34:56 Flurop Dose total: 586
11:34:56 Fluoroscopy dose: 586 mGy
11:34:59 Contrast amount:Isovue 300 107ml.
11:35:03 Post-op/insertion site Right Femoral artery dressed
using a 4 x 4 and Tegaderm.
11:35:06 Post-procedure physical assessment completed. ASA
score P 2 - A patient with mild systemic disease as
per Robert Schaefer MD.
11:35:08 Post procedure rhythm: sinus rhythm
11:35:10 Estimated blood loss: 10 ml
11:35:12 Post procedure instruction explained to
patient.Patient verbalizes understanding.
11:35:12 Patient needs reinforcement of post procedure
teaching.
11:35:27 Procedure type changed to Cath procedure, Diagnostic
procedure, LHC, LHC w/Coronaries, Sedation Charges,
Moderate Sedation up to 15 minutes, PCI procedure,
Coronary Stent, Coronary Stent Initial, Peripheral
Cath Diagnostic Procedure, Cath Lab Peripheral
Procedures, Four Vessel Arteriogram
11:35:57 Procedure and supply charges have been captured,
reviewed, submitted and are correct.
11:35:59 Procedure Complication : No complications
11:37:33 Plavix 600 mg P.O. was administered by Ck Hernandez RN;
for antiplatelet therapy;
11:40:31 Vital chart was stopped
11:40:31 See physician's report for complete and final results.
11:40:37 Report given to PCU.
11:40:49 Patient transfered to PCU with Bed.
 
11:40:53 Procedure ended.
11:40:53 Full Disclosure recording stopped
11:40:59 End room use (Document Last)
Intervention Summary
Intervention Notes
Time     ActionType  Lesion and  Equipment Used Action#  Pressure  Duration
Attributes
11:29:34 Inflate     Dist RCA    EMERGE OTW 3.0 1        12        00:20
balloon                 x 15 balloon
([id number])
11:32:34 Place stent Dist RCA    ERICKA RX 3.0 x  2        12        00:15
15 stent
([id number])
Device Usage
Item Name      Manufacture  Quantity  Catalog Number  Hospital Part     Current 
Minimal Lot# /
Charge   Number   Stock   Stock   Serial#
Code
ACIST Syringe  Acist        1         43047           194868   600917   185077  
20
(32563)        Medical
Systems Inc
Bag Decanter   Microtek     1         S           371467   76158    231690  
5
(S)        Medical Inc.
ACIST Hand     Acist        1         57783           917873   837380   640574  
5
Control        Medical
(04356)        Systems Inc
ACIST Manifold Acist        1         22654           893622   108389   894977  
5
(85544)        Medical
Systems Inc
Tegaderm 4 x 4 3M           1         1626W           659819   135328   146480  
5
(1626W)
Medline Cath   Medline      1         [id number]       561134   30653    388922  
5
Pack
([id number])
DIAGNOSTIC     Cardinal     1         PR4798          446167   24886    213198  
30
Multipack 5Fr  Health
catheter set
(CL0424)
SHEATH 5FR     Terumo       1         FAM579          691646   201523   316474  
5
[place]
(ATN423)
EMERALD Guide  Cardinal     1         502-455         489617   190865   794606  
5
Wire (502-455) Health
MULTIPACK JL   Cardinal     1                                           759513  
5
4.0 5Fr        Health
catheter
DIAGNOSTIC     Cardinal     1         909194I         591236   192138   726277  
9
3DRC 5Fr       Health
catheter
 
(951922Y)
MULTIPACK      Cardinal     1                                           400563  
5
Pigtail 5 Fr   Health
catheter
SHEATH 6FR     Terumo       1         YUT383          055339   476444   546047  
40
[place]
(VNM184)
WHISPER 300cm  Rivera       1         [id number]       772908   536597   606936  
5
guide wire     Vascular
(1648488BA)
INFLATOR Merit Merit        1         EM2317          935172   814210   476083  
15
[facility]    Medical
(GQ6387)
GUIDE 6FR HS I Medtronic    1         LA6HSI          038005   62304    602623  
1
catheter
(LA6HSI)
EMERGE OTW 3.0 [place]       1         [id number]  867968   798510   482302  
5       [id number]
x 15 balloon   Scientific
([id number])
ERICKA RX 3.0 x  Medtronic    1         [id number]    351818   4549967  625279  
5       [id number]
15 stent
([id number])
EXOSEAL 6Fr    Cardinal     1                    869612   683086   877738  
10
()        Health
Signature Audit [place]
Stage           Time        Signature      Unsigned
Intra-Procedure 2019    Dede Nicolas
11:41:48 AM RT(R)
Signatures
Monitor : Dede Nicolas Signature :
RT                       ______________________________
Date : ______________ Time :
______________
 
 
 
 
 
 
 
 
 
 
 
 
 
 
[id number] [place] [address]

## 2019-07-05 NOTE — NUR
ADMIT TO ROOM 2120 FROM ER. ALERT/ORIENTED. TELEMETRY STARTED. SR / 72.
ADMISSION HISTORY AND ASSESSMENT COMPLETED. HOME MEDS REVIEWED. PT C/O
EXTREME CHEST PAIN 10/10 AT THIS TIME. PAGE/RETURN CALL FROM [place] [facility]
ORDERS RECIEVED FOR IV MORPHINE FOR PAIN CONTROL.
 
YCNTHIA CRAWFORD ON UNIT SEEING PATIENT.

## 2019-07-06 VITALS — DIASTOLIC BLOOD PRESSURE: 69 MMHG | SYSTOLIC BLOOD PRESSURE: 142 MMHG

## 2019-07-06 VITALS — DIASTOLIC BLOOD PRESSURE: 85 MMHG | SYSTOLIC BLOOD PRESSURE: 143 MMHG

## 2019-07-06 VITALS — SYSTOLIC BLOOD PRESSURE: 137 MMHG | DIASTOLIC BLOOD PRESSURE: 79 MMHG

## 2019-07-06 VITALS — DIASTOLIC BLOOD PRESSURE: 87 MMHG | SYSTOLIC BLOOD PRESSURE: 145 MMHG

## 2019-07-06 VITALS — DIASTOLIC BLOOD PRESSURE: 76 MMHG | SYSTOLIC BLOOD PRESSURE: 149 MMHG

## 2019-07-06 VITALS — SYSTOLIC BLOOD PRESSURE: 159 MMHG | DIASTOLIC BLOOD PRESSURE: 89 MMHG

## 2019-07-06 LAB
ALBUMIN SERPL-MCNC: 3.2 G/DL (ref 3.4–5)
ALP SERPL-CCNC: 54 U/L (ref 46–116)
ALT SERPL-CCNC: 46 U/L (ref 10–68)
ANION GAP SERPL CALC-SCNC: 12.6 MMOL/L (ref 8–16)
ANION GAP SERPL CALC-SCNC: 14.1 MMOL/L (ref 8–16)
BASOPHILS NFR BLD AUTO: 0.2 % (ref 0–2)
BILIRUB SERPL-MCNC: 0.58 MG/DL (ref 0.2–1.3)
BUN SERPL-MCNC: 11 MG/DL (ref 7–18)
BUN SERPL-MCNC: 12 MG/DL (ref 7–18)
CALCIUM SERPL-MCNC: 8.2 MG/DL (ref 8.5–10.1)
CALCIUM SERPL-MCNC: 8.4 MG/DL (ref 8.5–10.1)
CHLORIDE SERPL-SCNC: 104 MMOL/L (ref 98–107)
CHLORIDE SERPL-SCNC: 104 MMOL/L (ref 98–107)
CO2 SERPL-SCNC: 23.9 MMOL/L (ref 21–32)
CO2 SERPL-SCNC: 25.5 MMOL/L (ref 21–32)
CREAT SERPL-MCNC: 0.7 MG/DL (ref 0.6–1.3)
CREAT SERPL-MCNC: 0.8 MG/DL (ref 0.6–1.3)
EOSINOPHIL NFR BLD: 1.8 % (ref 0–7)
ERYTHROCYTE [DISTWIDTH] IN BLOOD BY AUTOMATED COUNT: 13 % (ref 11.5–14.5)
GLOBULIN SER-MCNC: 3.8 G/L
GLUCOSE SERPL-MCNC: 111 MG/DL (ref 74–106)
GLUCOSE SERPL-MCNC: 112 MG/DL (ref 74–106)
HCT VFR BLD CALC: 42.5 % (ref 36–48)
HGB BLD-MCNC: 14.8 G/DL (ref 12–16)
IMM GRANULOCYTES NFR BLD: 0.2 % (ref 0–5)
LYMPHOCYTES NFR BLD AUTO: 17.4 % (ref 15–50)
MAGNESIUM SERPL-MCNC: 1.9 MG/DL (ref 1.8–2.4)
MCH RBC QN AUTO: 31.2 PG (ref 26–34)
MCHC RBC AUTO-ENTMCNC: 34.8 G/DL (ref 31–37)
MCV RBC: 89.5 FL (ref 80–100)
MONOCYTES NFR BLD: 6.8 % (ref 2–11)
NEUTROPHILS NFR BLD AUTO: 73.6 % (ref 40–80)
OSMOLALITY SERPL CALC.SUM OF ELEC: 275 MOSM/KG (ref 275–300)
OSMOLALITY SERPL CALC.SUM OF ELEC: 276 MOSM/KG (ref 275–300)
PLATELET # BLD: 209 10X3/UL (ref 130–400)
PMV BLD AUTO: 11.7 FL (ref 7.4–10.4)
POTASSIUM SERPL-SCNC: 4 MMOL/L (ref 3.5–5.1)
POTASSIUM SERPL-SCNC: 4.1 MMOL/L (ref 3.5–5.1)
PROT SERPL-MCNC: 7 G/DL (ref 6.4–8.2)
RBC # BLD AUTO: 4.75 10X6/UL (ref 4–5.4)
SODIUM SERPL-SCNC: 138 MMOL/L (ref 136–145)
SODIUM SERPL-SCNC: 138 MMOL/L (ref 136–145)
WBC # BLD AUTO: 9.5 10X3/UL (ref 4.8–10.8)

## 2019-07-06 NOTE — NUR
PATIENT CAUGHT RIGHT HAND PIV ON TABLE WHEN GETTING OUT OF BED, PULLED IT OUT
OF HAND. CATH INTACT AND MINNIUM BLEEDING NOTED. RESITED PIV TO LEFT BACK
FOREARM. 1 STICK, 20 GAUGE, PATIENT TOLERATED WELL. PREOP MEDICATIONS GIVEN.
NO OTHER NEEDS AT THIS TIME. CALL LIGHT WIHTIN REACH AND BED IN LOWEST
POSITION.

## 2019-07-06 NOTE — NUR
REPORT RECIEVED AND ROUNDING COMPLETE. PATIENT LAYING IN BED. A&O THIS MORNING
X4. PATIENT HAS NO COMPLAINTS AT THIS TIME. PATIENT STATES CHEST PAINS ARE
MUCH BETTER THIS MORNING. PATIENT IS WEARING NC WITH 02 RUNNING AT 2L. PATIENT
HAS A RIGHT HAND SALINE LOCKED. DRESSING ADHERED TO SKIN AND SWAB CAP IN USE.
NO S/SX OF INFECTION. PATIENT IS SHOWING NO S/SX OF DISTRESS AT THIS TIME.
CALL LIGHT WITHIN REACH AND BED IN LOWEST POSITION.

## 2019-07-06 NOTE — NUR
1600
PATIENT IS ABLE TO SIT UP. RIGHT GROIN SOFT NO BLOOD NOTED.
1615
PATIENT UP TO THE BATHROOM AND PUT ON HER OWN CLOTHES. RIGHT GROIN SOFT NO
BLEEDING NOTED.
1630
PATIENT READY TO LEAVE. REVIEWED PATIENTS PAPERWORK WITH PATIENT. PATIENT
SIGNED PAPERWORK. RIGHT GROIN SOFT NO BLEEDING NOTED.
1700
REMOVED BOTH PIV FROM FOREARMS. BLEEDING NOTED HELD PRESSURE FOR 5 MIN WITH
BOTH ARMS. RIGHT GROIN SOFT NO BLEEDING NOTED
1715
BLEEDING STOPPED PATIENT REFUSED WHEELCHAIR, WALKED WITH PATIENT TO FRONT DOOR
TO MEET FRIEND THAT IS TO TAKE HER HOME

## 2019-07-07 NOTE — OP
PATIENT NAME:  LEON RATLIFF                         MEDICAL RECORD: [id number]
:57                                             LOCATION:D.M2     D.2120
                                                         ADMISSION DATE:19
SURGEON:  VONDA VALDEZ MD          
 
 
DATE OF OPERATION:  2019
 
PROCEDURE:  Four-vessel arteriography.
 
INDICATION:  Amaurosis fugax in a 63-year-old female with a history of
cardiovascular disease, multiple risk factors.
 
FINDINGS:
1.  Right common carotid is selectively engaged.  This is a smooth-walled
vessel, free of disease.  Right external carotid has some calcium deposition and
luminal wall disease, but no obstructive disease.  Right internal carotid is
widely patent.
2.  The left internal carotid was selectively engaged.  This showed small
vessel, free of disease.  Left external carotid is smooth-walled vessel, free of
disease.  Left internal carotid has some calcifications in wall disease with no
obstructive disease.
 
TRANSINT:[id number] Voice Confirmation ID: [id number] DOCUMENT ID: [id number]
                                           
                                           VONDA VALDEZ MD          
 
 
 
Electronically Signed by VONDA VALDEZ on 19 at 1048
 
 
 
 
 
 
 
 
 
 
 
 
 
 
 
 
 
 
 
 
CC:                                                             [id number]
DICTATION DATE: 19 1152     :     19 1334      DIS IN  
                                                                      19
[facility]                                          
[address] [place] [address]

## 2019-07-07 NOTE — OP
PATIENT NAME:  LEON RATLIFF                         MEDICAL RECORD: [id number]
:57                                             LOCATION:D.M2     D.2120
                                                         ADMISSION DATE:19
SURGEON:  VONDA VALDEZ MD          
 
 
DATE OF OPERATION:  2019
 
PROCEDURE:  PTCA and stent report.
 
CATHETERS:  A 5-Filipino sheath, 5/4 left and right Karine, 5/4 pig.
 
The procedure was well tolerated.  The patient was returned to the dugan.  Sheath
removed.  ExoSeal device placed.
 
FINDINGS:  Left ventriculography in 30-degree JAEGER view; normal wall motion,
normal systolic function.
 
CORONARY ANATOMY:
LEFT MAIN:  Left main is free of disease.
LAD:  An area of previous stenting is widely patent with no evidence of
restenosis.  No evidence of progression of disease.
CIRCUMFLEX:  Free of disease.
RIGHT CORONARY ARTERY:  Has end-stent restenosis of 90% before the takeoff of
the PD.
 
PLAN:  Intervention momentarily.
 
DESCRIPTION OF PROCEDURE:  A 5-Filipino sheath was exchanged for a 6-Filipino
sheath.  A hockey stick guide catheter provided good guide catheter support
followed by 300-cm Whisper wire.  Pre-deployment balloon used was a 3.0 x 15 mm
[place].  Stent deployed was a 3.0 x 15 mm [place] drug-eluting stent up to 14
atmospheres.  Final angiography shows excellent resolution of 90% plus stenosis,
no significant residual, good step up and step down proximally and distally
respectively.  Sheath closed with ExoSeal device.  The patient was loaded with
Plavix in the lab.
 
TRANSINT:[id number] Voice Confirmation ID: [id number] DOCUMENT ID: [id number]
                                           
                                           VONDA VALDEZ MD          
 
 
 
Electronically Signed by VONDA VALDEZ on 19 at 1048
 
 
 
 
 
 
CC:                                                             [id number]
DICTATION DATE: 19 1153     :     19 1418      DIS IN  
                                                                      19
[facility]                                          
[address] [place] [address]

## 2019-07-07 NOTE — CN
PATIENT NAME:LEON RATLIFF                             MEDICAL RECORD: [id number]
: 57                                              LOCATION:D. D.2120
ADMIT DATE: 19                                       ACCOUNT: [id number]
CONSULTING PHYSICIAN:    VONDA VALDEZ MD          
                                               
REFERRING PHYSICIAN:     SANDRA CHAPPELL MD               
 
 
DATE OF CONSULTATION:  2019
 
HISTORY OF PRESENT ILLNESS:  A 63-year-old female with a history of coronary
artery disease, status post intervention, has a history of hypertension as well
as dyslipidemia, 2-3 week history of chest tightness and pressure as well as
fatigue.  Has some visual changes, describes almost amaurosis fugax type
symptomatology over the same time period with resolution over about 5-10
minutes.  We are asked to see her concerning her cardiovascular status.
 
PAST MEDICAL HISTORY:  Includes:
1.  History of hypertension.
2.  Hyperlipidemia.
3.  Coronary artery disease, status post intervention.
4.  Psoriasis with psoriatic arthritis.
 
MEDICATIONS:  Typically include Norvasc 5 mg p.o. day, Mobic 7.5 every day,
aspirin 81 every day.
 
ALLERGIES:  None known.
 
SOCIAL HISTORY:  Smokes about a pack a day.  Occasional marijuana use.  No set
exercise program.  Easily takes care of her ADLs.
 
REVIEW OF SYSTEMS:  The patient reports easy bruising but reports no swollen
glands. The patient reports no fever, no night sweats, no significant weight
gain, no significant weight loss.  No significant exercise tolerance.  The
patient reports no dry eyes, no irritation, no vision change.  Patient reports
no difficulty hearing and no ear pain.  Patient reports no frequent nose bleeds
or nose and sinus problems.  Patient reports on arm pain on exertion.   No
shortness of breath while lying down.  No history of heart murmur.  Patient
reports no cough, no wheezing or coughing up blood.  Patient reports no
abdominal pain, no vomiting.  Normal appetite.  No diarrhea and not vomiting
blood.  No nausea and no constipation.  Patient reports no incontinence.  No
difficulty urinating.  No hematuria.  No increased frequency.  Patient reports
no muscle aches.  No weakness, no arthralgias, no back pain.  No swelling of the
extremities.  Patient reports no abnormal mole, no jaundice, no rashes.  Reports
no loss of consciousness.  No weakness and no numbness.  No seizures, dizziness,
or headaches.  The patient reports no depression, no sleep disturbance, feeling
safe in a relationship and no alcohol abuse.  Patient reports on fatigue. 
Reports no runny nose or sinus pressure.  No itching, no hives, and no frequent
sneezing.
 
PHYSICAL EXAMINATION:
GENERAL:  Well-developed, well-nourished, no acute distress.
VITAL SIGNS:  Blood pressure 143/85, pulse 63 and regular.
HEENT:  Normocephalic, atraumatic.
NECK:  No bruits are noted.
HEART:  Regular.  S4 gallop is noted.
LUNGS:  Good air excursion.
ABDOMEN:  Soft, nontender.
 
 
 
CONSULT REPORT                                 [id number]    LEON RATLIFF           
 
 
EXTREMITIES:  Pulses 2+.  There is no edema.
 
DIAGNOSTIC DATA:  ECG shows nonspecific ST-T changes inferolaterally.  Cardiac
enzymes are elevated at this point consistent with NSTEMI.
 
IMPRESSION:  Non-ST-elevation myocardial infarction with known history of
coronary artery disease.  Additionally given her neurological symptomology,
particularly with amaurosis fugax, we will plan for angiography as well as
4-vessel arteriography.  Further recommendations based on the above.
 
TRANSINT:[id number] Voice Confirmation ID: [id number] DOCUMENT ID: [id number]
                                           
                                           VONDA VALDEZ MD          
 
 
 
Electronically Signed by VONDA VALDEZ on 19 at 1048
 
 
 
 
 
 
 
 
 
 
 
 
 
 
 
 
 
 
 
 
 
 
 
 
 
 
 
 
 
 
CC:                                                             [id number]
DICTATION DATE: 19 0958     :     19 1215      DIS IN  
                                                                      19
[id number] [place] [address]

## 2019-07-08 NOTE — MORECARE
CASE MANAGEMENT DISCHARGE SUMMARY
 
 
PATIENT: LEON RATLIFF                   UNIT: [id number]
ACCOUNT#: [id number]                       ADM DATE: 19
AGE: 63     : [birth year]  SEX: F            ROOM/BED: D.2830    
AUTHOR: LYDAI AGUSTIN                             PHYSICIAN:                               
 
REFERRING PHYSICIAN: SANDRA CHAPPELL MD               
DATE OF SERVICE: 19
Discharge Plan
 
 
Patient Name: LEON RATLIFF
Facility: [facility]:[place]
Encounter #: [id number]
Medical Record #: [id number]
: 1955
Planned Disposition: Home
Anticipated Discharge Date: 19
 
Discharge Date: 2019
Expected LOS: 1
Initial Reviewer: [id number]
Initial Review Date: 2019
Generated: 19   9:43 am 
  
 
 
 
 
 
 
 
Patient Name: LEON RATLIFF
 
Encounter #: [id number]
Page [id number]
 
 
 
 
 
Electronically Signed by LYDIA AGUSTIN on 19 at 0843
 
 
 
 
 
 
**All edits/amendments must be made on the electronic document**
 
DICTATION DATE: 1942     : OSIRIS  1942     
RPT#: [id number]                                DC DATE:19
                                               STATUS: DIS IN  
[facility]
[address] [place] [address]
***END OF REPORT***

## 2020-05-01 ENCOUNTER — HOSPITAL ENCOUNTER (EMERGENCY)
Dept: HOSPITAL 84 - D.ER | Age: 65
Discharge: HOME | End: 2020-05-01
Payer: MEDICARE

## 2020-05-01 VITALS — BODY MASS INDEX: 31.32 KG/M2 | WEIGHT: 211.44 LBS | HEIGHT: 69 IN

## 2020-05-01 VITALS — SYSTOLIC BLOOD PRESSURE: 154 MMHG | DIASTOLIC BLOOD PRESSURE: 81 MMHG

## 2020-05-01 DIAGNOSIS — I10: ICD-10-CM

## 2020-05-01 DIAGNOSIS — Z72.0: ICD-10-CM

## 2020-05-01 DIAGNOSIS — M84.48XA: Primary | ICD-10-CM
